# Patient Record
Sex: FEMALE | Race: AMERICAN INDIAN OR ALASKA NATIVE
[De-identification: names, ages, dates, MRNs, and addresses within clinical notes are randomized per-mention and may not be internally consistent; named-entity substitution may affect disease eponyms.]

---

## 2018-05-16 ENCOUNTER — HOSPITAL ENCOUNTER (OUTPATIENT)
Dept: HOSPITAL 5 - SPVWC | Age: 68
Discharge: HOME | End: 2018-05-16
Attending: INTERNAL MEDICINE
Payer: MEDICARE

## 2018-05-16 DIAGNOSIS — Z12.31: Primary | ICD-10-CM

## 2018-05-16 PROCEDURE — 77067 SCR MAMMO BI INCL CAD: CPT

## 2018-05-16 NOTE — MAMMOGRAPHY REPORT
BILATERAL DIGITAL SCREENING MAMMOGRAM with CAD : 05/16/18 08:15:00



CLINICAL: Routine screening.



COMPARISON:10/12/16



FINDINGS: The breasts are heterogeneously dense, which may obscure 

small masses.Bilateral benign secretory calcifications. No mass, 

architectural distortion or suspicious calcifications.



IMPRESSION: No mammographic evidence of malignancy.



BI-RADS CATEGORY:  2 -- Benign



RECOMMENDATION: Routine mammographic screening in one year.



COMMENT:

Patient follow-up letters are generated by our [employer] application.

## 2021-07-28 NOTE — MAMMOGRAPHY REPORT
DIGITAL SCREENING MAMMOGRAM WITH CAD, 7/28/2021



CLINICAL INFORMATION / INDICATION: Routine screening mammography.



TECHNIQUE:  Digital bilateral 2D mammography was obtained in the craniocaudal and mediolateral obliqu
e projections. This examination was interpreted with the benefit of Computer-Aided Detection analysis
.



COMPARISON: 5/16/2018



FINDINGS: 



Breast Density: There are scattered areas of fibroglandular density.



No dominant mass, suspicious calcifications, or architectural distortion in either breast. 



Benign secretory calcifications are noted bilaterally. Overall, no interval change.

 

IMPRESSION: No mammographic evidence of malignancy.



Follow up recommendation: Routine yearly



BI-RADS Category 2:  Benign.





-------------------------------------------------------------------------------------------

A "normal" or negative report should not discourage follow up or biopsy of a clinically significant f
inding.



A written summary of these findings will be mailed to the patient. The patient will be entered into a
 mammography reporting system which will generate a reminder letter for the patient's next appointmen
t at the appropriate interval.



The [employer] recommends yearly mammograms starting at age 40 and continuing as l
billie as a woman is in good health.  Breast MRI is recommended for women with an approximate 20-25% or 
greater lifetime risk of breast cancer, including women with a strong family history of breast or ova
yonatan cancer or who have been treated for Hodgkin's disease.



Signer Name: Margaret Ewing MD 

Signed: 7/28/2021 1:57 PM

Workstation Name: [id number]